# Patient Record
Sex: FEMALE | Race: WHITE | NOT HISPANIC OR LATINO
[De-identification: names, ages, dates, MRNs, and addresses within clinical notes are randomized per-mention and may not be internally consistent; named-entity substitution may affect disease eponyms.]

---

## 2017-10-31 ENCOUNTER — RESULT REVIEW (OUTPATIENT)
Age: 42
End: 2017-10-31

## 2017-11-09 ENCOUNTER — OUTPATIENT (OUTPATIENT)
Dept: OUTPATIENT SERVICES | Facility: HOSPITAL | Age: 42
LOS: 1 days | End: 2017-11-09
Payer: COMMERCIAL

## 2017-11-09 ENCOUNTER — APPOINTMENT (OUTPATIENT)
Dept: MAMMOGRAPHY | Facility: CLINIC | Age: 42
End: 2017-11-09
Payer: COMMERCIAL

## 2017-11-09 DIAGNOSIS — Z00.8 ENCOUNTER FOR OTHER GENERAL EXAMINATION: ICD-10-CM

## 2017-11-09 PROCEDURE — 77063 BREAST TOMOSYNTHESIS BI: CPT

## 2017-11-09 PROCEDURE — 77067 SCR MAMMO BI INCL CAD: CPT

## 2017-11-09 PROCEDURE — G0202: CPT | Mod: 26

## 2017-11-09 PROCEDURE — 77063 BREAST TOMOSYNTHESIS BI: CPT | Mod: 26

## 2017-11-14 ENCOUNTER — APPOINTMENT (OUTPATIENT)
Dept: ULTRASOUND IMAGING | Facility: CLINIC | Age: 42
End: 2017-11-14
Payer: COMMERCIAL

## 2017-11-14 ENCOUNTER — OUTPATIENT (OUTPATIENT)
Dept: OUTPATIENT SERVICES | Facility: HOSPITAL | Age: 42
LOS: 1 days | End: 2017-11-14
Payer: COMMERCIAL

## 2017-11-14 ENCOUNTER — APPOINTMENT (OUTPATIENT)
Dept: MAMMOGRAPHY | Facility: CLINIC | Age: 42
End: 2017-11-14
Payer: COMMERCIAL

## 2017-11-14 DIAGNOSIS — Z00.8 ENCOUNTER FOR OTHER GENERAL EXAMINATION: ICD-10-CM

## 2017-11-14 PROCEDURE — G0279: CPT

## 2017-11-14 PROCEDURE — G0279: CPT | Mod: 26

## 2017-11-14 PROCEDURE — G0206: CPT | Mod: 26,LT

## 2017-11-14 PROCEDURE — 77065 DX MAMMO INCL CAD UNI: CPT

## 2017-12-15 ENCOUNTER — OUTPATIENT (OUTPATIENT)
Dept: OUTPATIENT SERVICES | Facility: HOSPITAL | Age: 42
LOS: 1 days | End: 2017-12-15
Payer: COMMERCIAL

## 2017-12-15 ENCOUNTER — APPOINTMENT (OUTPATIENT)
Dept: ULTRASOUND IMAGING | Facility: CLINIC | Age: 42
End: 2017-12-15
Payer: COMMERCIAL

## 2017-12-15 DIAGNOSIS — Z00.00 ENCOUNTER FOR GENERAL ADULT MEDICAL EXAMINATION WITHOUT ABNORMAL FINDINGS: ICD-10-CM

## 2017-12-15 PROCEDURE — 76641 ULTRASOUND BREAST COMPLETE: CPT | Mod: 26,50

## 2017-12-15 PROCEDURE — 76641 ULTRASOUND BREAST COMPLETE: CPT

## 2018-11-06 ENCOUNTER — RESULT REVIEW (OUTPATIENT)
Age: 43
End: 2018-11-06

## 2018-12-17 ENCOUNTER — APPOINTMENT (OUTPATIENT)
Dept: MAMMOGRAPHY | Facility: CLINIC | Age: 43
End: 2018-12-17

## 2018-12-17 ENCOUNTER — APPOINTMENT (OUTPATIENT)
Dept: ULTRASOUND IMAGING | Facility: CLINIC | Age: 43
End: 2018-12-17

## 2019-02-06 ENCOUNTER — FORM ENCOUNTER (OUTPATIENT)
Age: 44
End: 2019-02-06

## 2019-02-07 ENCOUNTER — APPOINTMENT (OUTPATIENT)
Dept: MAMMOGRAPHY | Facility: CLINIC | Age: 44
End: 2019-02-07
Payer: COMMERCIAL

## 2019-02-07 ENCOUNTER — OUTPATIENT (OUTPATIENT)
Dept: OUTPATIENT SERVICES | Facility: HOSPITAL | Age: 44
LOS: 1 days | End: 2019-02-07
Payer: COMMERCIAL

## 2019-02-07 ENCOUNTER — APPOINTMENT (OUTPATIENT)
Dept: ULTRASOUND IMAGING | Facility: CLINIC | Age: 44
End: 2019-02-07
Payer: COMMERCIAL

## 2019-02-07 DIAGNOSIS — R92.8 OTHER ABNORMAL AND INCONCLUSIVE FINDINGS ON DIAGNOSTIC IMAGING OF BREAST: ICD-10-CM

## 2019-02-07 PROCEDURE — 77063 BREAST TOMOSYNTHESIS BI: CPT | Mod: 26

## 2019-02-07 PROCEDURE — 77067 SCR MAMMO BI INCL CAD: CPT | Mod: 26

## 2019-02-07 PROCEDURE — 76641 ULTRASOUND BREAST COMPLETE: CPT | Mod: 26,50

## 2019-02-07 PROCEDURE — 77067 SCR MAMMO BI INCL CAD: CPT

## 2019-02-07 PROCEDURE — 77063 BREAST TOMOSYNTHESIS BI: CPT

## 2019-02-07 PROCEDURE — 76641 ULTRASOUND BREAST COMPLETE: CPT

## 2019-02-11 ENCOUNTER — FORM ENCOUNTER (OUTPATIENT)
Age: 44
End: 2019-02-11

## 2019-02-12 ENCOUNTER — OUTPATIENT (OUTPATIENT)
Dept: OUTPATIENT SERVICES | Facility: HOSPITAL | Age: 44
LOS: 1 days | End: 2019-02-12
Payer: COMMERCIAL

## 2019-02-12 ENCOUNTER — APPOINTMENT (OUTPATIENT)
Dept: ULTRASOUND IMAGING | Facility: CLINIC | Age: 44
End: 2019-02-12
Payer: COMMERCIAL

## 2019-02-12 DIAGNOSIS — N64.4 MASTODYNIA: ICD-10-CM

## 2019-02-12 PROCEDURE — 76642 ULTRASOUND BREAST LIMITED: CPT

## 2019-02-12 PROCEDURE — 76642 ULTRASOUND BREAST LIMITED: CPT | Mod: 26,LT

## 2019-02-15 DIAGNOSIS — R92.8 OTHER ABNORMAL AND INCONCLUSIVE FINDINGS ON DIAGNOSTIC IMAGING OF BREAST: ICD-10-CM

## 2019-02-20 ENCOUNTER — FORM ENCOUNTER (OUTPATIENT)
Age: 44
End: 2019-02-20

## 2019-02-21 ENCOUNTER — APPOINTMENT (OUTPATIENT)
Dept: ULTRASOUND IMAGING | Facility: CLINIC | Age: 44
End: 2019-02-21
Payer: COMMERCIAL

## 2019-02-21 ENCOUNTER — RESULT REVIEW (OUTPATIENT)
Age: 44
End: 2019-02-21

## 2019-02-21 ENCOUNTER — OUTPATIENT (OUTPATIENT)
Dept: OUTPATIENT SERVICES | Facility: HOSPITAL | Age: 44
LOS: 1 days | End: 2019-02-21
Payer: COMMERCIAL

## 2019-02-21 DIAGNOSIS — R92.8 OTHER ABNORMAL AND INCONCLUSIVE FINDINGS ON DIAGNOSTIC IMAGING OF BREAST: ICD-10-CM

## 2019-02-21 DIAGNOSIS — Z00.8 ENCOUNTER FOR OTHER GENERAL EXAMINATION: ICD-10-CM

## 2019-02-21 PROCEDURE — 19083 BX BREAST 1ST LESION US IMAG: CPT | Mod: LT

## 2019-02-21 PROCEDURE — 77065 DX MAMMO INCL CAD UNI: CPT | Mod: 26,LT

## 2019-02-21 PROCEDURE — 88305 TISSUE EXAM BY PATHOLOGIST: CPT | Mod: 26

## 2019-02-21 PROCEDURE — 88305 TISSUE EXAM BY PATHOLOGIST: CPT

## 2019-02-21 PROCEDURE — 77065 DX MAMMO INCL CAD UNI: CPT

## 2019-02-21 PROCEDURE — 19083 BX BREAST 1ST LESION US IMAG: CPT

## 2019-02-21 PROCEDURE — A4648: CPT

## 2019-10-14 ENCOUNTER — RX RENEWAL (OUTPATIENT)
Age: 44
End: 2019-10-14

## 2019-10-31 ENCOUNTER — RECORD ABSTRACTING (OUTPATIENT)
Age: 44
End: 2019-10-31

## 2019-10-31 DIAGNOSIS — Z78.9 OTHER SPECIFIED HEALTH STATUS: ICD-10-CM

## 2019-11-12 ENCOUNTER — APPOINTMENT (OUTPATIENT)
Dept: OBGYN | Facility: CLINIC | Age: 44
End: 2019-11-12
Payer: COMMERCIAL

## 2019-11-12 VITALS
HEIGHT: 66 IN | WEIGHT: 148 LBS | DIASTOLIC BLOOD PRESSURE: 60 MMHG | BODY MASS INDEX: 23.78 KG/M2 | SYSTOLIC BLOOD PRESSURE: 100 MMHG

## 2019-11-12 PROCEDURE — 82270 OCCULT BLOOD FECES: CPT

## 2019-11-12 PROCEDURE — 99396 PREV VISIT EST AGE 40-64: CPT

## 2019-11-12 RX ORDER — NORETHINDRONE ACETATE/ETHINYL ESTRADIOL AND FERROUS FUMARATE 1MG-20(21)
1-20 KIT ORAL
Qty: 3 | Refills: 0 | Status: DISCONTINUED | COMMUNITY
Start: 2019-10-14 | End: 2019-11-12

## 2019-11-13 LAB
CYTOLOGY CVX/VAG DOC THIN PREP: NORMAL
DATE COLLECTED: NORMAL
HEMOCCULT SP1 STL QL: NEGATIVE
HPV HIGH+LOW RISK DNA PNL CVX: NOT DETECTED
QUALITY CONTROL: YES

## 2019-11-13 NOTE — HISTORY OF PRESENT ILLNESS
[Last Mammogram ___] : Last Mammogram was [unfilled] [Last Pap ___] : Last cervical pap smear was [unfilled] [Definite:  ___ (Date)] : the last menstrual period was [unfilled] [Menarche Age: ____] : age at menarche was [unfilled] [Sexually Active] : is sexually active [Contraception] : uses contraception [Oral Contraceptives] : uses oral contraceptives [Male ___] : [unfilled] male [de-identified] : BRUS-02/07/2019-BR0 BRUS-02/12/2019-BR4B

## 2019-11-13 NOTE — PHYSICAL EXAM
[Awake] : awake [Alert] : alert [Soft] : soft [Oriented x3] : oriented to person, place, and time [Labia Majora] : labia major [Labia Minora] : labia minora [No Bleeding] : there was no active vaginal bleeding [Uterine Adnexae] : were not tender and not enlarged [Normal] : clitoris [No Tenderness] : no rectal tenderness [Nl Sphincter Tone] : normal sphincter tone [Acute Distress] : no acute distress [Nipple Discharge] : no nipple discharge [Mass] : no breast mass [Axillary LAD] : no axillary lymphadenopathy [Tender] : non tender [Occult Blood] : occult blood test from digital rectal exam was negative [FreeTextEntry6] : normal

## 2019-11-20 LAB — CYTOLOGY CVX/VAG DOC THIN PREP: NORMAL

## 2020-03-31 ENCOUNTER — APPOINTMENT (OUTPATIENT)
Dept: MAMMOGRAPHY | Facility: CLINIC | Age: 45
End: 2020-03-31

## 2020-03-31 ENCOUNTER — APPOINTMENT (OUTPATIENT)
Dept: ULTRASOUND IMAGING | Facility: CLINIC | Age: 45
End: 2020-03-31

## 2020-11-15 ENCOUNTER — RX RENEWAL (OUTPATIENT)
Age: 45
End: 2020-11-15

## 2020-11-24 ENCOUNTER — APPOINTMENT (OUTPATIENT)
Dept: OBGYN | Facility: CLINIC | Age: 45
End: 2020-11-24
Payer: COMMERCIAL

## 2020-11-24 VITALS
DIASTOLIC BLOOD PRESSURE: 72 MMHG | HEIGHT: 66 IN | SYSTOLIC BLOOD PRESSURE: 114 MMHG | BODY MASS INDEX: 26.42 KG/M2 | WEIGHT: 164.38 LBS

## 2020-11-24 DIAGNOSIS — Z80.1 FAMILY HISTORY OF MALIGNANT NEOPLASM OF TRACHEA, BRONCHUS AND LUNG: ICD-10-CM

## 2020-11-24 DIAGNOSIS — Z01.419 ENCOUNTER FOR GYNECOLOGICAL EXAMINATION (GENERAL) (ROUTINE) W/OUT ABNORMAL FINDINGS: ICD-10-CM

## 2020-11-24 DIAGNOSIS — Z83.3 FAMILY HISTORY OF DIABETES MELLITUS: ICD-10-CM

## 2020-11-24 PROCEDURE — 36415 COLL VENOUS BLD VENIPUNCTURE: CPT

## 2020-11-24 PROCEDURE — 99396 PREV VISIT EST AGE 40-64: CPT

## 2020-11-24 RX ORDER — BACILLUS COAGULANS/INULIN 1B-250 MG
CAPSULE ORAL
Refills: 0 | Status: ACTIVE | COMMUNITY

## 2020-11-24 RX ORDER — UBIDECARENONE/VIT E ACET 100MG-5
CAPSULE ORAL
Refills: 0 | Status: ACTIVE | COMMUNITY

## 2020-11-24 NOTE — REVIEW OF SYSTEMS
[Patient Intake Form Reviewed] : Patient intake form was reviewed [Negative] : Heme/Lymph [Abn Vaginal bleeding] : abnormal vaginal bleeding

## 2020-11-24 NOTE — END OF VISIT
[FreeTextEntry3] : I, Harris Merino, acted solely as a scribe for Dr. Leung on this date 11/24/2020.\par All medical record entries made by the Scribe were at my, Dr. Leung's direction and personally dictated by me on  11/24/2020. I have reviewed the chart and agree that the record accurately reflects my personal performance of the history, physical exam, assessment and plan. I have also personally directed, reviewed, and agreed with the chart.\par

## 2020-11-24 NOTE — DISCUSSION/SUMMARY
[FreeTextEntry1] : -Annual well woman visit done today. Pap not collected today due to moderate amount of vaginal blood on exam (since she is on her period). She will need pap collected at her next visit. Patient declines STI screening.\par \par -Irregular periods with oligomenorrhea- AUB hormone panel and pelvic sono ordered.\par \par -h/o fibroadenoma on left breast biopsy in 2019- Rx diagnostic mammogram and breast ultrasound ordered. She has no breast complaints. Benign breast exam.\par \par -Contraceptive counseling done- Patient has been taking Microgestin OCPs without any issues and desires a refill today. Rx Microgestin was refilled. Risks and side effects were discussed. Risks discussed include but are not limited to headache, mood swing, breast tenderness, weight gain, and blood clots. We discussed the importance of taking OCPs daily at the same time.\par \par -She was advised to take Vitamin D, Calcium and continue exercises. \par \par -Recommended that she schedule her preventative visit with PCP. She states that she does not have a PCP. Dr. Lewis's contact information was given. \par 	\par -Return in 2 weeks for pap, pelvic sono, and AUB follow up.

## 2020-11-24 NOTE — HISTORY OF PRESENT ILLNESS
[Patient reported PAP Smear was normal] : Patient reported PAP Smear was normal [HIV test declined] : HIV test declined [Syphilis test declined] : Syphilis test declined [Gonorrhea test declined] : Gonorrhea test declined [Chlamydia test declined] : Chlamydia test declined [Trichomonas test declined] : Trichomonas test declined [HPV test offered] : HPV test offered [Hepatitis B test declined] : Hepatitis B test declined [Hepatitis C test declined] : Hepatitis C test declined [N] : Patient reports normal menses [Oral Contraceptive] : uses oral contraception pills [Monogamous (Male Partner)] : is monogamous with a male partner [Y] : Positive pregnancy history [Menarche Age: ____] : age at menarche was [unfilled] [Currently Active] : currently active [Men] : men [Vaginal] : vaginal [No] : No [Patient refuses STI testing] : Patient refuses STI testing [TextBox_4] : ANNUAL [Mammogramdate] : 02/07/2019 [TextBox_19] : B0 [BreastSonogramDate] : 02/07/2019 [TextBox_25] : B0//02/12/2019-BR4B [PapSmeardate] : 11/12/2019 [TextBox_31] : NEG [BoneDensityDate] : N/A [ColonoscopyDate] : N/A [HPVDate] : 11/12/2019 [TextBox_78] : NEG [LMPDate] : 11/22/2020 [PGHxTotal] : 2 [[facility]xFul[facility]] : 1 [PGxPremature] : 1 [[facility]] : 2 [FreeTextEntry1] : 11/22/2020 [FreeTextEntry3] : ORAL CONTRACEPTIVES

## 2020-11-24 NOTE — PHYSICAL EXAM
[Appropriately responsive] : appropriately responsive [Alert] : alert [No Acute Distress] : no acute distress [Soft] : soft [Non-tender] : non-tender [Non-distended] : non-distended [No Mass] : no mass [Oriented x3] : oriented x3 [Examination Of The Breasts] : a normal appearance [No Masses] : no breast masses were palpable [Labia Majora] : normal [Labia Minora] : normal [Moderate] : There was moderate vaginal bleeding [Normal] : normal [Uterine Adnexae] : normal [No Discharge] : no discharge [Tenderness] : nontender [Mass ___ cm] : no uterine mass was palpated [FreeTextEntry4] : Moderate dark vaginal blood (patient is on her period) [FreeTextEntry5] : Pap not collected today.

## 2020-12-08 LAB
BASOPHILS # BLD AUTO: 0.04 K/UL
BASOPHILS NFR BLD AUTO: 0.4 %
EOSINOPHIL # BLD AUTO: 0.14 K/UL
EOSINOPHIL NFR BLD AUTO: 1.5 %
ESTRADIOL SERPL-MCNC: 23 PG/ML
FSH SERPL-MCNC: 6.1 IU/L
HCG SERPL-MCNC: <1 MIU/ML
HCT VFR BLD CALC: 39.9 %
HGB BLD-MCNC: 12.6 G/DL
IMM GRANULOCYTES NFR BLD AUTO: 0.2 %
LYMPHOCYTES # BLD AUTO: 2.24 K/UL
LYMPHOCYTES NFR BLD AUTO: 23.5 %
MAN DIFF?: NORMAL
MCHC RBC-ENTMCNC: 29.1 PG
MCHC RBC-ENTMCNC: 31.6 GM/DL
MCV RBC AUTO: 92.1 FL
MONOCYTES # BLD AUTO: 0.36 K/UL
MONOCYTES NFR BLD AUTO: 3.8 %
NEUTROPHILS # BLD AUTO: 6.72 K/UL
NEUTROPHILS NFR BLD AUTO: 70.6 %
PLATELET # BLD AUTO: 309 K/UL
PROLACTIN SERPL-MCNC: 9.4 NG/ML
RBC # BLD: 4.33 M/UL
RBC # FLD: 12.7 %
TSH SERPL-ACNC: 1.56 UIU/ML
WBC # FLD AUTO: 9.52 K/UL

## 2020-12-16 ENCOUNTER — ASOB RESULT (OUTPATIENT)
Age: 45
End: 2020-12-16

## 2020-12-16 ENCOUNTER — APPOINTMENT (OUTPATIENT)
Dept: OBGYN | Facility: CLINIC | Age: 45
End: 2020-12-16
Payer: COMMERCIAL

## 2020-12-16 VITALS
DIASTOLIC BLOOD PRESSURE: 60 MMHG | HEIGHT: 66 IN | TEMPERATURE: 97 F | BODY MASS INDEX: 25.39 KG/M2 | WEIGHT: 158 LBS | SYSTOLIC BLOOD PRESSURE: 102 MMHG

## 2020-12-16 DIAGNOSIS — Z01.419 ENCOUNTER FOR GYNECOLOGICAL EXAMINATION (GENERAL) (ROUTINE) W/OUT ABNORMAL FINDINGS: ICD-10-CM

## 2020-12-16 PROCEDURE — 76830 TRANSVAGINAL US NON-OB: CPT

## 2020-12-16 PROCEDURE — 99213 OFFICE O/P EST LOW 20 MIN: CPT | Mod: 25

## 2020-12-16 PROCEDURE — 99072 ADDL SUPL MATRL&STAF TM PHE: CPT

## 2020-12-16 NOTE — DISCUSSION/SUMMARY
[FreeTextEntry1] : -Irregular periods-\par 1) AUB labs were WNL.\par 2) Pelvic sono today: WNL. Uterus measuring 8.73 x 6.34 x 4.28 cm. EMS 2.9 mm. Normal appearing bilateral ovaries. Results were discussed.\par 3) We discussed endometrial sampling to r/o endometrial hyperplasia/cancer. Hysteroscopy and endometrial biopsy was discussed. Procedure details, r/b/a were discussed. Risks discussed include but are not limited to pain, bleeding, infection, uterine perforation and injury to nearby organs. She desires to think about it and will call back office when she decides.\par \par -Patient is currently taking Micronor pills without any issues. Other contraceptive options were reviewed (i.e. Mirena IUD), however she declines. She desires to continue with Micronor POPs.\par \par -Pap collected today.\par \par -h/o fibroadenoma on left breast biopsy in 2019- She has not scheduled her mammogram and breast ultrasound, however she agrees to schedule it soon.\par \par -She desires to think about hysteroscopy with EMB and will CBO when she decides. Follow up for annual GYN exam in 1 year.

## 2020-12-16 NOTE — HISTORY OF PRESENT ILLNESS
[perimenopausal] : perimenopausal [Irregular Cycle Intervals] : are  irregular [Irregular Duration] : has been irregular [Oral Contraceptive] : uses oral contraception pills [Y] : Patient is sexually active [Ultrasound] : ultrasound [Irregular Menstrual Interval] : irregular menstrual interval [Menarche Age: ____] : age at menarche was [unfilled] [Currently Active] : currently active [Men] : men [Vaginal] : vaginal [No] : No [Yes] : Yes [Mammogramdate] : 2/7/2019 [TextBox_19] : BR 0, Left Mammo and BX 2/21/2019 [BreastSonogramDate] : 2/7/2019 [TextBox_25] : BR 0 [PapSmeardate] : 11/12/2019 [TextBox_31] : Negative [BoneDensityDate] : NA [ColonoscopyDate] : NA [HPVDate] : 11/12/2019 [TextBox_78] : Not detected [LMPDate] : 11/22/2020 [PGHxTotal] : 2 [[facility]ul[facility]] : 1 [PGxPremature] : 1 [[facility]] : 2 [TextBox_27] : Pelvic 12/16/2020 [FreeTextEntry1] : 11/22/2020 [FreeTextEntry3] : OCP

## 2020-12-23 PROBLEM — Z01.419 ENCOUNTER FOR ANNUAL ROUTINE GYNECOLOGICAL EXAMINATION: Status: RESOLVED | Noted: 2020-11-24 | Resolved: 2020-12-23

## 2021-01-05 LAB
CYTOLOGY CVX/VAG DOC THIN PREP: NORMAL
HPV HIGH+LOW RISK DNA PNL CVX: NOT DETECTED

## 2021-03-08 ENCOUNTER — APPOINTMENT (OUTPATIENT)
Dept: FAMILY MEDICINE | Facility: CLINIC | Age: 46
End: 2021-03-08
Payer: COMMERCIAL

## 2021-03-08 ENCOUNTER — NON-APPOINTMENT (OUTPATIENT)
Age: 46
End: 2021-03-08

## 2021-03-08 VITALS
TEMPERATURE: 97.9 F | HEIGHT: 66 IN | HEART RATE: 51 BPM | BODY MASS INDEX: 25.88 KG/M2 | DIASTOLIC BLOOD PRESSURE: 64 MMHG | WEIGHT: 161 LBS | OXYGEN SATURATION: 100 % | SYSTOLIC BLOOD PRESSURE: 104 MMHG

## 2021-03-08 DIAGNOSIS — D24.2 BENIGN NEOPLASM OF LEFT BREAST: ICD-10-CM

## 2021-03-08 DIAGNOSIS — Z13.220 ENCOUNTER FOR SCREENING FOR LIPOID DISORDERS: ICD-10-CM

## 2021-03-08 DIAGNOSIS — Z13.1 ENCOUNTER FOR SCREENING FOR DIABETES MELLITUS: ICD-10-CM

## 2021-03-08 DIAGNOSIS — Z13.29 ENCOUNTER FOR SCREENING FOR OTHER SUSPECTED ENDOCRINE DISORDER: ICD-10-CM

## 2021-03-08 DIAGNOSIS — N92.6 IRREGULAR MENSTRUATION, UNSPECIFIED: ICD-10-CM

## 2021-03-08 DIAGNOSIS — Z00.00 ENCOUNTER FOR GENERAL ADULT MEDICAL EXAMINATION W/OUT ABNORMAL FINDINGS: ICD-10-CM

## 2021-03-08 DIAGNOSIS — Z13.21 ENCOUNTER FOR SCREENING FOR NUTRITIONAL DISORDER: ICD-10-CM

## 2021-03-08 DIAGNOSIS — Z23 ENCOUNTER FOR IMMUNIZATION: ICD-10-CM

## 2021-03-08 DIAGNOSIS — Z11.59 ENCOUNTER FOR SCREENING FOR OTHER VIRAL DISEASES: ICD-10-CM

## 2021-03-08 DIAGNOSIS — N64.4 MASTODYNIA: ICD-10-CM

## 2021-03-08 DIAGNOSIS — R00.1 BRADYCARDIA, UNSPECIFIED: ICD-10-CM

## 2021-03-08 PROCEDURE — 93000 ELECTROCARDIOGRAM COMPLETE: CPT | Mod: 59

## 2021-03-08 PROCEDURE — 99072 ADDL SUPL MATRL&STAF TM PHE: CPT

## 2021-03-08 PROCEDURE — G0444 DEPRESSION SCREEN ANNUAL: CPT

## 2021-03-08 PROCEDURE — G0442 ANNUAL ALCOHOL SCREEN 15 MIN: CPT

## 2021-03-08 PROCEDURE — 99386 PREV VISIT NEW AGE 40-64: CPT | Mod: 25

## 2021-03-08 NOTE — ASSESSMENT
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

## 2021-03-08 NOTE — HISTORY OF PRESENT ILLNESS
[FreeTextEntry1] : NP-CPE [de-identified] : 46 yo female presents for annual physical. No acute complaints. Denies fever, chills, cp, palpitations, sob, nv, heat/cold intolerance, dizziness, melena, hematochezia, muscle weakness, loss of sensation, bowel/bladder incontinence or calf pain.\par

## 2021-03-08 NOTE — HEALTH RISK ASSESSMENT
[Patient reported PAP Smear was normal] : Patient reported PAP Smear was normal [Very Good] : ~his/her~  mood as very good [Yes] : Yes [Monthly or less (1 pt)] : Monthly or less (1 point) [1 or 2 (0 pts)] : 1 or 2 (0 points) [Never (0 pts)] : Never (0 points) [No] : In the past 12 months have you used drugs other than those required for medical reasons? No [No falls in past year] : Patient reported no falls in the past year [0] : 2) Feeling down, depressed, or hopeless: Not at all (0) [Patient reported mammogram was abnormal] : Patient reported mammogram was abnormal [HIV test declined] : HIV test declined [Hepatitis C test declined] : Hepatitis C test declined [With Family] : lives with family [Employed] : employed [] :  [Sexually Active] : sexually active [Feels Safe at Home] : Feels safe at home [Fully functional (bathing, dressing, toileting, transferring, walking, feeding)] : Fully functional (bathing, dressing, toileting, transferring, walking, feeding) [Fully functional (using the telephone, shopping, preparing meals, housekeeping, doing laundry, using] : Fully functional and needs no help or supervision to perform IADLs (using the telephone, shopping, preparing meals, housekeeping, doing laundry, using transportation, managing medications and managing finances) [] : No [Audit-CScore] : 1 [de-identified] : regularly [de-identified] : needs improvement [[id number]] : 0 [High Risk Behavior] : no high risk behavior [Reports changes in hearing] : Reports no changes in hearing [Reports changes in vision] : Reports no changes in vision [Reports changes in dental health] : Reports no changes in dental health [MammogramDate] : 02/19 [MammogramComments] : fibroadenoma, left breast, pt has appt sched for 03/2021 [PapSmearDate] : 12/20

## 2021-03-11 ENCOUNTER — OUTPATIENT (OUTPATIENT)
Dept: OUTPATIENT SERVICES | Facility: HOSPITAL | Age: 46
LOS: 1 days | End: 2021-03-11
Payer: COMMERCIAL

## 2021-03-11 ENCOUNTER — RESULT REVIEW (OUTPATIENT)
Age: 46
End: 2021-03-11

## 2021-03-11 ENCOUNTER — APPOINTMENT (OUTPATIENT)
Dept: MAMMOGRAPHY | Facility: CLINIC | Age: 46
End: 2021-03-11
Payer: COMMERCIAL

## 2021-03-11 ENCOUNTER — APPOINTMENT (OUTPATIENT)
Dept: ULTRASOUND IMAGING | Facility: CLINIC | Age: 46
End: 2021-03-11
Payer: COMMERCIAL

## 2021-03-11 DIAGNOSIS — Z00.8 ENCOUNTER FOR OTHER GENERAL EXAMINATION: ICD-10-CM

## 2021-03-11 DIAGNOSIS — D24.2 BENIGN NEOPLASM OF LEFT BREAST: ICD-10-CM

## 2021-03-11 DIAGNOSIS — N92.6 IRREGULAR MENSTRUATION, UNSPECIFIED: ICD-10-CM

## 2021-03-11 PROCEDURE — 76641 ULTRASOUND BREAST COMPLETE: CPT | Mod: 26,50

## 2021-03-11 PROCEDURE — 76641 ULTRASOUND BREAST COMPLETE: CPT

## 2021-03-11 PROCEDURE — 77063 BREAST TOMOSYNTHESIS BI: CPT | Mod: 26

## 2021-03-11 PROCEDURE — 77067 SCR MAMMO BI INCL CAD: CPT

## 2021-03-11 PROCEDURE — 77067 SCR MAMMO BI INCL CAD: CPT | Mod: 26

## 2021-03-11 PROCEDURE — 77063 BREAST TOMOSYNTHESIS BI: CPT

## 2021-03-12 DIAGNOSIS — E78.5 HYPERLIPIDEMIA, UNSPECIFIED: ICD-10-CM

## 2021-03-12 DIAGNOSIS — R79.89 OTHER SPECIFIED ABNORMAL FINDINGS OF BLOOD CHEMISTRY: ICD-10-CM

## 2021-03-12 DIAGNOSIS — E87.5 HYPERKALEMIA: ICD-10-CM

## 2021-03-12 LAB
25(OH)D3 SERPL-MCNC: 27 NG/ML
ALBUMIN SERPL ELPH-MCNC: 4.1 G/DL
ALP BLD-CCNC: 72 U/L
ALT SERPL-CCNC: 12 U/L
ANION GAP SERPL CALC-SCNC: 10 MMOL/L
APPEARANCE: CLEAR
AST SERPL-CCNC: 17 U/L
BACTERIA: NEGATIVE
BASOPHILS # BLD AUTO: 0.05 K/UL
BASOPHILS NFR BLD AUTO: 0.7 %
BILIRUB SERPL-MCNC: 0.2 MG/DL
BILIRUBIN URINE: NEGATIVE
BLOOD URINE: NEGATIVE
BUN SERPL-MCNC: 13 MG/DL
CALCIUM SERPL-MCNC: 9.2 MG/DL
CHLORIDE SERPL-SCNC: 104 MMOL/L
CHOLEST SERPL-MCNC: 201 MG/DL
CO2 SERPL-SCNC: 24 MMOL/L
COLOR: NORMAL
CREAT SERPL-MCNC: 0.98 MG/DL
EOSINOPHIL # BLD AUTO: 0.16 K/UL
EOSINOPHIL NFR BLD AUTO: 2.2 %
ESTIMATED AVERAGE GLUCOSE: 103 MG/DL
GLUCOSE QUALITATIVE U: NEGATIVE
GLUCOSE SERPL-MCNC: 89 MG/DL
HBA1C MFR BLD HPLC: 5.2 %
HCT VFR BLD CALC: 38.3 %
HDLC SERPL-MCNC: 61 MG/DL
HGB BLD-MCNC: 12.2 G/DL
HYALINE CASTS: 0 /LPF
IMM GRANULOCYTES NFR BLD AUTO: 0.3 %
KETONES URINE: NEGATIVE
LDLC SERPL CALC-MCNC: 121 MG/DL
LEUKOCYTE ESTERASE URINE: NEGATIVE
LYMPHOCYTES # BLD AUTO: 2.7 K/UL
LYMPHOCYTES NFR BLD AUTO: 37.2 %
MAN DIFF?: NORMAL
MCHC RBC-ENTMCNC: 28.5 PG
MCHC RBC-ENTMCNC: 31.9 GM/DL
MCV RBC AUTO: 89.5 FL
MICROSCOPIC-UA: NORMAL
MONOCYTES # BLD AUTO: 0.34 K/UL
MONOCYTES NFR BLD AUTO: 4.7 %
NEUTROPHILS # BLD AUTO: 3.98 K/UL
NEUTROPHILS NFR BLD AUTO: 54.9 %
NITRITE URINE: NEGATIVE
NONHDLC SERPL-MCNC: 140 MG/DL
PH URINE: 6
PLATELET # BLD AUTO: 298 K/UL
POTASSIUM SERPL-SCNC: 5.4 MMOL/L
PROT SERPL-MCNC: 6.9 G/DL
PROTEIN URINE: NEGATIVE
RBC # BLD: 4.28 M/UL
RBC # FLD: 13.2 %
RED BLOOD CELLS URINE: 1 /HPF
SARS-COV-2 IGG SERPL IA-ACNC: 0.07 INDEX
SARS-COV-2 IGG SERPL QL IA: NEGATIVE
SODIUM SERPL-SCNC: 137 MMOL/L
SPECIFIC GRAVITY URINE: 1.01
SQUAMOUS EPITHELIAL CELLS: 2 /HPF
TRIGL SERPL-MCNC: 93 MG/DL
TSH SERPL-ACNC: 1.58 UIU/ML
UROBILINOGEN URINE: NORMAL
WBC # FLD AUTO: 7.25 K/UL
WHITE BLOOD CELLS URINE: 0 /HPF

## 2021-03-12 RX ORDER — MULTIVIT-MIN/FOLIC/VIT K/LYCOP 400-300MCG
50 MCG TABLET ORAL
Qty: 30 | Refills: 2 | Status: ACTIVE | COMMUNITY
Start: 2021-03-12

## 2021-12-01 ENCOUNTER — NON-APPOINTMENT (OUTPATIENT)
Age: 46
End: 2021-12-01

## 2021-12-02 ENCOUNTER — RESULT CHARGE (OUTPATIENT)
Age: 46
End: 2021-12-02

## 2021-12-02 ENCOUNTER — APPOINTMENT (OUTPATIENT)
Dept: OBGYN | Facility: CLINIC | Age: 46
End: 2021-12-02
Payer: COMMERCIAL

## 2021-12-02 VITALS
BODY MASS INDEX: 25.23 KG/M2 | SYSTOLIC BLOOD PRESSURE: 108 MMHG | WEIGHT: 157 LBS | DIASTOLIC BLOOD PRESSURE: 72 MMHG | HEIGHT: 66 IN

## 2021-12-02 DIAGNOSIS — Z01.419 ENCOUNTER FOR GYNECOLOGICAL EXAMINATION (GENERAL) (ROUTINE) W/OUT ABNORMAL FINDINGS: ICD-10-CM

## 2021-12-02 LAB
DATE COLLECTED: NORMAL
HEMOCCULT SP1 STL QL: NEGATIVE
QUALITY CONTROL: YES

## 2021-12-02 PROCEDURE — 82270 OCCULT BLOOD FECES: CPT

## 2021-12-02 PROCEDURE — 99396 PREV VISIT EST AGE 40-64: CPT

## 2021-12-02 RX ORDER — NORETHINDRONE ACETATE/ETHINYL ESTRADIOL AND FERROUS FUMARATE 1MG-20(21)
1-20 KIT ORAL
Qty: 3 | Refills: 3 | Status: ACTIVE | COMMUNITY
Start: 2019-11-13 | End: 1900-01-01

## 2021-12-02 NOTE — PHYSICAL EXAM
[No Lesions] : no lesions  [Labia Majora] : normal [Labia Minora] : normal [Pink Rugae] : pink rugae [No Bleeding] : There was no active vaginal bleeding [Normal rectal exam] : was normal [Normal Brown Stool] : was normal and brown [Normal] : was normal [Occult Blood Positive] : was negative for occult blood analysis [Gross Blood] : no gross blood

## 2021-12-02 NOTE — HISTORY OF PRESENT ILLNESS
[HPV test offered] : HPV test offered [N] : Patient reports normal menses [Oral Contraceptive] : uses oral contraception pills [Y] : Positive pregnancy history [Menarche Age: ____] : age at menarche was [unfilled] [Currently Active] : currently active [Men] : men [Vaginal] : vaginal [No] : No [Patient refuses STI testing] : Patient refuses STI testing [Mammogramdate] : 03/11/2021 [TextBox_19] : BR2 [BreastSonogramDate] : 03/11/2021 [TextBox_25] : BR2 [PapSmeardate] : 12/16/2020 [TextBox_31] : Negative [HPVDate] : 12/16/2020 [TextBox_78] : Negative [LMPDate] : 11/22/2021 [PGHxTotal] : 2 [[facility]xFul[facility]] : 1 [PGxPremature] : 1 [[facility]] : 2 [FreeTextEntry1] : 11/22/2021

## 2021-12-02 NOTE — DISCUSSION/SUMMARY
[FreeTextEntry1] : Pt taking Vit D, initiated to increase weight bearing exercise. The R/B/C of OBC's reviewed.   RTO annual yearly.  All the pt's questions and concerns were addressed.

## 2021-12-06 LAB — HPV HIGH+LOW RISK DNA PNL CVX: NOT DETECTED

## 2021-12-29 LAB — CYTOLOGY CVX/VAG DOC THIN PREP: NORMAL

## 2022-03-08 ENCOUNTER — APPOINTMENT (OUTPATIENT)
Dept: FAMILY MEDICINE | Facility: CLINIC | Age: 47
End: 2022-03-08

## 2022-04-11 PROBLEM — Z11.59 SCREENING FOR VIRAL DISEASE: Status: ACTIVE | Noted: 2021-03-08

## 2022-05-05 ENCOUNTER — APPOINTMENT (OUTPATIENT)
Dept: MAMMOGRAPHY | Facility: CLINIC | Age: 47
End: 2022-05-05
Payer: COMMERCIAL

## 2022-05-05 ENCOUNTER — RESULT REVIEW (OUTPATIENT)
Age: 47
End: 2022-05-05

## 2022-05-05 ENCOUNTER — OUTPATIENT (OUTPATIENT)
Dept: OUTPATIENT SERVICES | Facility: HOSPITAL | Age: 47
LOS: 1 days | End: 2022-05-05
Payer: COMMERCIAL

## 2022-05-05 ENCOUNTER — APPOINTMENT (OUTPATIENT)
Dept: ULTRASOUND IMAGING | Facility: CLINIC | Age: 47
End: 2022-05-05
Payer: COMMERCIAL

## 2022-05-05 DIAGNOSIS — Z00.8 ENCOUNTER FOR OTHER GENERAL EXAMINATION: ICD-10-CM

## 2022-05-05 DIAGNOSIS — R92.2 INCONCLUSIVE MAMMOGRAM: ICD-10-CM

## 2022-05-05 DIAGNOSIS — Z12.39 ENCOUNTER FOR OTHER SCREENING FOR MALIGNANT NEOPLASM OF BREAST: ICD-10-CM

## 2022-05-05 PROCEDURE — 77063 BREAST TOMOSYNTHESIS BI: CPT | Mod: 26

## 2022-05-05 PROCEDURE — 76641 ULTRASOUND BREAST COMPLETE: CPT | Mod: 26,50

## 2022-05-05 PROCEDURE — 77067 SCR MAMMO BI INCL CAD: CPT | Mod: 26

## 2022-05-05 PROCEDURE — 76641 ULTRASOUND BREAST COMPLETE: CPT

## 2022-05-05 PROCEDURE — 77067 SCR MAMMO BI INCL CAD: CPT

## 2022-05-05 PROCEDURE — 77063 BREAST TOMOSYNTHESIS BI: CPT

## 2022-10-31 ENCOUNTER — RX RENEWAL (OUTPATIENT)
Age: 47
End: 2022-10-31

## 2022-12-16 ENCOUNTER — NON-APPOINTMENT (OUTPATIENT)
Age: 47
End: 2022-12-16

## 2022-12-17 ENCOUNTER — RESULT CHARGE (OUTPATIENT)
Age: 47
End: 2022-12-17

## 2022-12-17 ENCOUNTER — APPOINTMENT (OUTPATIENT)
Dept: OBGYN | Facility: CLINIC | Age: 47
End: 2022-12-17

## 2022-12-17 VITALS
BODY MASS INDEX: 25.07 KG/M2 | WEIGHT: 156 LBS | HEIGHT: 66 IN | DIASTOLIC BLOOD PRESSURE: 72 MMHG | SYSTOLIC BLOOD PRESSURE: 100 MMHG

## 2022-12-17 LAB
DATE COLLECTED: NORMAL
HEMOCCULT SP1 STL QL: NEGATIVE
QUALITY CONTROL: YES

## 2022-12-17 PROCEDURE — 99396 PREV VISIT EST AGE 40-64: CPT

## 2022-12-17 PROCEDURE — 82270 OCCULT BLOOD FECES: CPT

## 2022-12-17 RX ORDER — OMEGA-3/DHA/EPA/FISH OIL 300-1000MG
CAPSULE ORAL
Refills: 0 | Status: DISCONTINUED | COMMUNITY
End: 2022-12-17

## 2022-12-17 RX ORDER — ZINC SULFATE 50(220)MG
CAPSULE ORAL
Refills: 0 | Status: DISCONTINUED | COMMUNITY
End: 2022-12-17

## 2022-12-17 NOTE — DISCUSSION/SUMMARY
[FreeTextEntry1] : The R/B/C of OBC's were reviewed.  Pt to RTO annual exam yearly, sooner if needed.  All the pt's questions and concerns were addressed.

## 2022-12-17 NOTE — HISTORY OF PRESENT ILLNESS
[N] : Patient reports normal menses [Oral Contraceptive] : uses oral contraception pills [Y] : Positive pregnancy history [Menarche Age: ____] : age at menarche was [unfilled] [No] : Patient does not have concerns regarding sex [Currently Active] : currently active [Mammogramdate] : 05/05/22 [TextBox_19] : BR2 [BreastSonogramDate] : 05/05/22 [TextBox_25] : BR2 [PapSmeardate] : 12/02/21 [TextBox_31] : neg  [HPVDate] : 12/02/21 [TextBox_78] : neg  [LMPDate] : 12/03/22 [PGHxTotal] : 2 [PGxPremature] : 1 [[facility]xFulerm] : 1 [[facility]] : 2 [FreeTextEntry1] : 12/03/22

## 2022-12-20 LAB — HPV HIGH+LOW RISK DNA PNL CVX: NOT DETECTED

## 2023-01-01 NOTE — DISCUSSION/SUMMARY
1 [Pregnancy Prevention] : pregnancy prevention [Combined Oral Contraception] : combined oral contraception [FreeTextEntry1] : The R/B/C of OBC's were reviewed.  All the pt's questions and concerns were addressed.

## 2023-01-02 LAB — CYTOLOGY CVX/VAG DOC THIN PREP: NORMAL

## 2023-05-11 ENCOUNTER — APPOINTMENT (OUTPATIENT)
Dept: MAMMOGRAPHY | Facility: CLINIC | Age: 48
End: 2023-05-11
Payer: COMMERCIAL

## 2023-05-11 ENCOUNTER — RESULT REVIEW (OUTPATIENT)
Age: 48
End: 2023-05-11

## 2023-05-11 ENCOUNTER — APPOINTMENT (OUTPATIENT)
Dept: ULTRASOUND IMAGING | Facility: CLINIC | Age: 48
End: 2023-05-11
Payer: COMMERCIAL

## 2023-05-11 ENCOUNTER — OUTPATIENT (OUTPATIENT)
Dept: OUTPATIENT SERVICES | Facility: HOSPITAL | Age: 48
LOS: 1 days | End: 2023-05-11
Payer: COMMERCIAL

## 2023-05-11 DIAGNOSIS — R92.2 INCONCLUSIVE MAMMOGRAM: ICD-10-CM

## 2023-05-11 DIAGNOSIS — Z00.8 ENCOUNTER FOR OTHER GENERAL EXAMINATION: ICD-10-CM

## 2023-05-11 DIAGNOSIS — Z12.39 ENCOUNTER FOR OTHER SCREENING FOR MALIGNANT NEOPLASM OF BREAST: ICD-10-CM

## 2023-05-11 PROCEDURE — 77063 BREAST TOMOSYNTHESIS BI: CPT | Mod: 26

## 2023-05-11 PROCEDURE — 77067 SCR MAMMO BI INCL CAD: CPT | Mod: 26

## 2023-05-11 PROCEDURE — 76641 ULTRASOUND BREAST COMPLETE: CPT | Mod: 26,50

## 2023-05-11 PROCEDURE — 76641 ULTRASOUND BREAST COMPLETE: CPT

## 2023-05-11 PROCEDURE — 77063 BREAST TOMOSYNTHESIS BI: CPT

## 2023-05-11 PROCEDURE — 77067 SCR MAMMO BI INCL CAD: CPT

## 2023-11-20 ENCOUNTER — RX RENEWAL (OUTPATIENT)
Age: 48
End: 2023-11-20

## 2023-11-20 RX ORDER — NORETHINDRONE ACETATE AND ETHINYL ESTRADIOL AND FERROUS FUMARATE 1MG-20(21)
1-20 KIT ORAL
Qty: 3 | Refills: 0 | Status: ACTIVE | COMMUNITY
Start: 2023-11-20 | End: 1900-01-01

## 2023-12-19 ENCOUNTER — APPOINTMENT (OUTPATIENT)
Dept: OBGYN | Facility: CLINIC | Age: 48
End: 2023-12-19
Payer: COMMERCIAL

## 2023-12-19 VITALS
BODY MASS INDEX: 25.07 KG/M2 | DIASTOLIC BLOOD PRESSURE: 70 MMHG | SYSTOLIC BLOOD PRESSURE: 120 MMHG | HEIGHT: 66 IN | WEIGHT: 156 LBS

## 2023-12-19 DIAGNOSIS — Z12.11 ENCOUNTER FOR SCREENING FOR MALIGNANT NEOPLASM OF COLON: ICD-10-CM

## 2023-12-19 DIAGNOSIS — Z01.419 ENCOUNTER FOR GYNECOLOGICAL EXAMINATION (GENERAL) (ROUTINE) W/OUT ABNORMAL FINDINGS: ICD-10-CM

## 2023-12-19 DIAGNOSIS — Z12.39 ENCOUNTER FOR OTHER SCREENING FOR MALIGNANT NEOPLASM OF BREAST: ICD-10-CM

## 2023-12-19 DIAGNOSIS — Z30.41 ENCOUNTER FOR SURVEILLANCE OF CONTRACEPTIVE PILLS: ICD-10-CM

## 2023-12-19 DIAGNOSIS — R92.30 DENSE BREASTS, UNSPECIFIED: ICD-10-CM

## 2023-12-19 DIAGNOSIS — Z12.4 ENCOUNTER FOR SCREENING FOR MALIGNANT NEOPLASM OF CERVIX: ICD-10-CM

## 2023-12-19 DIAGNOSIS — Z11.51 ENCOUNTER FOR SCREENING FOR HUMAN PAPILLOMAVIRUS (HPV): ICD-10-CM

## 2023-12-19 LAB
DATE COLLECTED: NORMAL
HEMOCCULT SP1 STL QL: NEGATIVE

## 2023-12-19 PROCEDURE — 99396 PREV VISIT EST AGE 40-64: CPT

## 2023-12-19 PROCEDURE — 82270 OCCULT BLOOD FECES: CPT

## 2023-12-19 RX ORDER — NORETHINDRONE ACETATE AND ETHINYL ESTRADIOL 1MG-20(21)
1-20 KIT ORAL
Qty: 1 | Refills: 1 | Status: ACTIVE | COMMUNITY
Start: 2022-10-31 | End: 1900-01-01

## 2023-12-19 NOTE — REASON FOR VISIT
PA was submitted for the patient's brand name Prevacid 30 mg Capsules via CoverMyMeds on 06/20/2022.     Key: [id number] - PA Case ID: [id number]  DX: K21.9    Clinical questions were answered and sent to plan, waiting on a determination.   
[Annual] : an annual visit.

## 2023-12-20 LAB — HPV HIGH+LOW RISK DNA PNL CVX: NOT DETECTED

## 2023-12-23 LAB — CYTOLOGY CVX/VAG DOC THIN PREP: NORMAL

## 2024-01-01 PROBLEM — Z30.41 ENCOUNTER FOR BIRTH CONTROL PILLS MAINTENANCE: Status: ACTIVE | Noted: 2019-11-13

## 2024-01-01 NOTE — PHYSICAL EXAM
[Appropriately responsive] : appropriately responsive [Alert] : alert [No Acute Distress] : no acute distress [Oriented x3] : oriented x3 [Examination Of The Breasts] : a normal appearance [No Discharge] : no discharge [No Masses] : no breast masses were palpable [No Lesions] : no lesions  [Labia Majora] : normal [Labia Minora] : normal [Pink Rugae] : pink rugae [No Bleeding] : There was no active vaginal bleeding [Normal Position] : in a normal position [Uterine Adnexae] : normal [Normal rectal exam] : was normal [Normal Brown Stool] : was normal and brown [Normal] : was normal [None] : there was no rectal mass  [Excoriation] : no perianal excoriation [Fistula] : no fistulas [Wart] : no warts [Ulcer ___ cm] : no ulcers [Occult Blood Positive] : was negative for occult blood analysis [Gross Blood] : no gross blood [Fecal Impaction] : no fecal impaction

## 2024-01-01 NOTE — DISCUSSION/SUMMARY
[FreeTextEntry1] : The R/B/C of OBC's reviewed.  Pt to RTO annual yearly, sooner if needed.  All the pt's question/concerns addressed.

## 2024-01-01 NOTE — COUNSELING
[Nutrition/ Exercise/ Weight Management] : nutrition, exercise, weight management [Vitamins/Supplements] : vitamins/supplements [Sunscreen] : sunscreen [Breast Self Exam] : breast self exam [Contraception/ Emergency Contraception/ Safe Sexual Practices] : contraception, emergency contraception, safe sexual practices [FreeTextEntry2] : Current recommendations regarding colonoscopy reviewed.  Yearly skin exam with dermatologist reviewed.

## 2024-01-01 NOTE — HISTORY OF PRESENT ILLNESS
[N] : Patient reports normal menses [Y] : Positive pregnancy history [Menarche Age: ____] : age at menarche was [unfilled] [No] : Patient does not have concerns regarding sex [Currently Active] : currently active [Mammogramdate] : 05/11/23 [TextBox_19] : BR 2 [TextBox_25] : BR 2 [BreastSonogramDate] : 05/11/23 [PapSmeardate] : 12/17/22 [TextBox_31] : NEG [HPVDate] : 12/17/22 [TextBox_78] : NEG [LMPDate] : 11/28/23 [PGHxTotal] : 2 [[facility]] : 1 [PGxPremature] : 1 [[facility]] : 2 [FreeTextEntry1] : 11/28/23

## 2024-11-15 ENCOUNTER — NON-APPOINTMENT (OUTPATIENT)
Age: 49
End: 2024-11-15

## 2024-12-16 NOTE — PHYSICAL EXAM
[Appropriately responsive] : appropriately responsive [Alert] : alert [No Acute Distress] : no acute distress [Oriented x3] : oriented x3 increased demand [Labia Majora] : normal [Labia Minora] : normal [No Bleeding] : There was no active vaginal bleeding [Normal] : normal

## 2024-12-31 ENCOUNTER — RX RENEWAL (OUTPATIENT)
Age: 49
End: 2024-12-31

## 2025-02-17 ENCOUNTER — TRANSCRIPTION ENCOUNTER (OUTPATIENT)
Age: 50
End: 2025-02-17

## 2025-02-20 ENCOUNTER — APPOINTMENT (OUTPATIENT)
Dept: OBGYN | Facility: CLINIC | Age: 50
End: 2025-02-20

## 2025-02-20 VITALS
HEIGHT: 66 IN | WEIGHT: 169 LBS | SYSTOLIC BLOOD PRESSURE: 112 MMHG | DIASTOLIC BLOOD PRESSURE: 66 MMHG | BODY MASS INDEX: 27.16 KG/M2

## 2025-02-20 DIAGNOSIS — Z12.11 ENCOUNTER FOR SCREENING FOR MALIGNANT NEOPLASM OF COLON: ICD-10-CM

## 2025-02-20 DIAGNOSIS — Z12.4 ENCOUNTER FOR SCREENING FOR MALIGNANT NEOPLASM OF CERVIX: ICD-10-CM

## 2025-02-20 DIAGNOSIS — Z01.419 ENCOUNTER FOR GYNECOLOGICAL EXAMINATION (GENERAL) (ROUTINE) W/OUT ABNORMAL FINDINGS: ICD-10-CM

## 2025-02-20 DIAGNOSIS — Z12.39 ENCOUNTER FOR OTHER SCREENING FOR MALIGNANT NEOPLASM OF BREAST: ICD-10-CM

## 2025-02-20 DIAGNOSIS — R92.30 DENSE BREASTS, UNSPECIFIED: ICD-10-CM

## 2025-02-20 DIAGNOSIS — Z11.51 ENCOUNTER FOR SCREENING FOR HUMAN PAPILLOMAVIRUS (HPV): ICD-10-CM

## 2025-02-20 LAB
DATE COLLECTED: NORMAL
HEMOCCULT 2: NEGATIVE
HEMOCCULT 3: NEGATIVE
HEMOCCULT SP1 STL QL: NEGATIVE
QUALITY CONTROL: NO
QUALITY CONTROL: NO

## 2025-02-20 PROCEDURE — 99396 PREV VISIT EST AGE 40-64: CPT

## 2025-02-20 PROCEDURE — 99459 PELVIC EXAMINATION: CPT

## 2025-02-20 PROCEDURE — 82272 OCCULT BLD FECES 1-3 TESTS: CPT

## 2025-02-22 LAB — HPV HIGH+LOW RISK DNA PNL CVX: NOT DETECTED

## 2025-02-25 LAB — CYTOLOGY CVX/VAG DOC THIN PREP: NORMAL

## 2025-03-05 ENCOUNTER — RESULT REVIEW (OUTPATIENT)
Age: 50
End: 2025-03-05

## 2025-03-05 ENCOUNTER — APPOINTMENT (OUTPATIENT)
Dept: MAMMOGRAPHY | Facility: CLINIC | Age: 50
End: 2025-03-05
Payer: COMMERCIAL

## 2025-03-05 ENCOUNTER — OUTPATIENT (OUTPATIENT)
Dept: OUTPATIENT SERVICES | Facility: HOSPITAL | Age: 50
LOS: 1 days | End: 2025-03-05
Payer: COMMERCIAL

## 2025-03-05 ENCOUNTER — APPOINTMENT (OUTPATIENT)
Dept: ULTRASOUND IMAGING | Facility: CLINIC | Age: 50
End: 2025-03-05
Payer: COMMERCIAL

## 2025-03-05 DIAGNOSIS — Z12.39 ENCOUNTER FOR OTHER SCREENING FOR MALIGNANT NEOPLASM OF BREAST: ICD-10-CM

## 2025-03-05 DIAGNOSIS — Z00.8 ENCOUNTER FOR OTHER GENERAL EXAMINATION: ICD-10-CM

## 2025-03-05 PROCEDURE — 77067 SCR MAMMO BI INCL CAD: CPT | Mod: 26

## 2025-03-05 PROCEDURE — 77063 BREAST TOMOSYNTHESIS BI: CPT | Mod: 26

## 2025-03-05 PROCEDURE — 77067 SCR MAMMO BI INCL CAD: CPT

## 2025-03-05 PROCEDURE — 76641 ULTRASOUND BREAST COMPLETE: CPT | Mod: 26,50

## 2025-03-05 PROCEDURE — 77063 BREAST TOMOSYNTHESIS BI: CPT

## 2025-03-05 PROCEDURE — 76641 ULTRASOUND BREAST COMPLETE: CPT
